# Patient Record
Sex: FEMALE | Race: WHITE
[De-identification: names, ages, dates, MRNs, and addresses within clinical notes are randomized per-mention and may not be internally consistent; named-entity substitution may affect disease eponyms.]

---

## 2020-10-07 ENCOUNTER — HOSPITAL ENCOUNTER (OUTPATIENT)
Dept: HOSPITAL 62 - RAD | Age: 25
End: 2020-10-07
Attending: NURSE PRACTITIONER
Payer: MEDICAID

## 2020-10-07 DIAGNOSIS — Z3A.08: ICD-10-CM

## 2020-10-07 DIAGNOSIS — Z34.81: Primary | ICD-10-CM

## 2020-10-07 PROCEDURE — 76801 OB US < 14 WKS SINGLE FETUS: CPT

## 2020-10-07 NOTE — RADIOLOGY REPORT (SQ)
EXAM DESCRIPTION:  U/S [id number] TRNABD 1GES W/ODOP



IMAGES COMPLETED DATE/TIME:  10/7/2020 3:38 pm



REASON FOR STUDY:  Z34.81 ENCOUNTER FOR SUPRVSN OF NORMAL PREGNANCY, FIRST TRIMESTER Z34.81  ENCOUNTE
R FOR SUPRVSN OF NORMAL PREGNANCY, FIRST TRIM



COMPARISON:  None.



TECHNIQUE:  Transabdominal static and realtime grayscale images acquired of the pelvis. Additional se
lected spectral and color Doppler images recorded. All images stored on PACs.

[facility] Not available.

CLINICAL DATES:  8 weeks, 4 days



LIMITATIONS:  None.



FINDINGS:  FETUS:  Single Living intrauterine pregnancy.

ULTRASOUND EGA: 8 weeks, 4 days

ULTRASOUND MALINDA: 5/15/2021

CRL:  2.0 cm

FHR: 158  beats per minute.

FETAL SURVEY: Too early to assess.

AMNIOTIC FLUID: Adequate amount.

PLACENTA: Not yet developed due to early gestation.

SUBCHORIONIC BLEED: No.

SIZE OF BLEED: Not applicable.

UTERUS: No masses. No anomalies.

CERVICAL LENGTH: 3.3 cm   Closed.

RIGHT ADNEXA: Normal ovary with normal vascular flow.

No adnexal free fluid.

No adnexal masses.

LEFT ADNEXA: Normal ovary with normal vascular flow.

No adnexal free fluid.

No adnexal masses.

FREE FLUID: None.

OTHER: No other significant finding.



IMPRESSION:  LIVING INTRAUTERINE PREGNANCY.

EGA 8 weeks, 4 days

Trimester of pregnancy: First trimester - 0 to 13 weeks.



TECHNICAL DOCUMENTATION:  JOB ID:  [id number]

 2011 [employer]- All Rights Reserved                            rev-5/18



Reading location - IP/workstation name: [email or web address]